# Patient Record
Sex: MALE | Race: BLACK OR AFRICAN AMERICAN | HISPANIC OR LATINO | Employment: UNEMPLOYED | URBAN - METROPOLITAN AREA
[De-identification: names, ages, dates, MRNs, and addresses within clinical notes are randomized per-mention and may not be internally consistent; named-entity substitution may affect disease eponyms.]

---

## 2020-08-31 ENCOUNTER — OFFICE VISIT (OUTPATIENT)
Dept: URGENT CARE | Age: 35
End: 2020-08-31
Payer: COMMERCIAL

## 2020-08-31 VITALS
BODY MASS INDEX: 21.55 KG/M2 | HEIGHT: 68 IN | HEART RATE: 86 BPM | WEIGHT: 142.2 LBS | OXYGEN SATURATION: 100 % | RESPIRATION RATE: 20 BRPM | TEMPERATURE: 98.7 F | SYSTOLIC BLOOD PRESSURE: 145 MMHG | DIASTOLIC BLOOD PRESSURE: 92 MMHG

## 2020-08-31 DIAGNOSIS — R07.89 CHEST DISCOMFORT: Primary | ICD-10-CM

## 2020-08-31 DIAGNOSIS — R07.89 CHEST WALL PAIN: ICD-10-CM

## 2020-08-31 PROCEDURE — 99203 OFFICE O/P NEW LOW 30 MIN: CPT | Performed by: NURSE PRACTITIONER

## 2020-08-31 PROCEDURE — S9088 SERVICES PROVIDED IN URGENT: HCPCS | Performed by: NURSE PRACTITIONER

## 2020-08-31 PROCEDURE — 93005 ELECTROCARDIOGRAM TRACING: CPT | Performed by: NURSE PRACTITIONER

## 2020-08-31 NOTE — PROGRESS NOTES
NAME: Timmy Sosa is a 30 y o  male  : 1985    MRN: [id number]    /92   Pulse 86   Temp 98 7 °F (37 1 °C)   Resp 20   Ht 5' 8" (1 727 m)   Wt 64 5 kg (142 lb 3 2 oz)   SpO2 100%   BMI 21 62 kg/m²     Assessment and Plan   Chest discomfort [R07 89]  1  Chest discomfort  ECG 12 lead   2  Chest wall pain         Cinhtia Cornell was seen today for chest pain  Diagnoses and all orders for this visit:    Chest discomfort  -     ECG 12 lead    Chest wall pain        Patient Instructions   Patient Instructions   No symptoms of pain today  EKG done today and Normal Sinus Rhythm  Pt aware to go to the ER if symptoms worsen or other signs of Chest pain, SOB, or discomfort occur  Pt verbalized understanding and girlfriend, ambulated out of the office with no signs of distress  My chart wilma was discussed and encouraged to download  Discussed obtaining a PCP    Proceed to ER if symptoms worsen  Chief Complaint     Chief Complaint   Patient presents with    Chest Pain     Pt reports intermittent left sided chest pain x 3 days  Denies jaw pain, has occ numbness in left elbow  No OTC meds taken  History of Present Illness     29 yo male here today with his girlfriend Jose Elias Valdes at bedside with c/o of Left sided CP  The pain started 3-4 days ago  PT has no symptoms today  Has taken nothing OTC for the symptoms, has no PCP and wanted to come and be seen  Denies CP, SOB, HA, nausea, vomiting, diarrhea, body aches at this time  Denies injury to the left side of chest and has no recent exercise in the past few days  He denies any recent exposure or symptoms of COVID  Denies fevers    Chest Pain    This is a new problem  The current episode started in the past 7 days (3-4 days)  The onset quality is gradual  The problem occurs intermittently  The problem has been resolved (asymptomatic today but wanted to be seen)  The pain is present in the lateral region (left sided )  The pain is at a severity of 0/10   The patient is experiencing no pain  The pain does not radiate  Associated symptoms comments: No other symptoms when pain was present    He has tried nothing for the symptoms  Risk factors include alcohol intake, smoking/tobacco exposure and male gender (drinks 4 beers or more daily)  Past medical history comments: denies hx   His family medical history is significant for diabetes, heart disease, hyperlipidemia and hypertension  Pertinent negatives for family medical history include: no sickle cell disease  Prior diagnostic workup includes echocardiogram        Review of Systems   Review of Systems   Constitutional: Negative  HENT: Negative  Respiratory: Negative  Cardiovascular: Positive for chest pain (asymptomatic today)  Gastrointestinal: Negative  Genitourinary: Negative  Musculoskeletal: Negative  Skin: Negative  Neurological: Negative  Current Medications     No current outpatient medications on file  Current Allergies     Allergies as of 08/31/2020    (No Known Allergies)              History reviewed  No pertinent past medical history  History reviewed  No pertinent surgical history  History reviewed  No pertinent family history  Medications have been verified  The following portions of the patient's history were reviewed and updated as appropriate: allergies, current medications, past family history, past medical history, past social history, past surgical history and problem list     Objective   /92   Pulse 86   Temp 98 7 °F (37 1 °C)   Resp 20   Ht 5' 8" (1 727 m)   Wt 64 5 kg (142 lb 3 2 oz)   SpO2 100%   BMI 21 62 kg/m²      Physical Exam     Physical Exam  Constitutional:       General: He is not in acute distress  Appearance: He is well-developed  He is not ill-appearing  Neck:      Musculoskeletal: Full passive range of motion without pain and normal range of motion     Cardiovascular:      Rate and Rhythm: Normal rate and regular rhythm  Heart sounds: Normal heart sounds  Pulmonary:      Effort: Pulmonary effort is normal       Breath sounds: Normal breath sounds and air entry  No stridor  No decreased breath sounds, wheezing or rhonchi  Chest:      Chest wall: No mass  Breasts: Breasts are symmetrical          Right: Normal          Left: Normal       Comments: Unable to reproduce the symptoms to chest at time of exam  Musculoskeletal: Normal range of motion  Skin:     General: Skin is warm  Capillary Refill: Capillary refill takes less than 2 seconds  Comments: No abrasions, open wounds, signs of injury or trauma to the chest   Neurological:      Mental Status: He is alert and oriented to person, place, and time     Psychiatric:         Attention and Perception: Attention normal          Mood and Affect: Mood normal          GANGA Bass

## 2020-08-31 NOTE — PATIENT INSTRUCTIONS
No symptoms of pain today  EKG done today and Normal Sinus Rhythm  Pt aware to go to the ER if symptoms worsen or other signs of Chest pain, SOB, or discomfort occur   Pt verbalized understanding and girlfriend, ambulated out of the office with no signs of distress  My chart wilma was discussed and encouraged to download  Discussed obtaining a PCP

## 2020-09-01 LAB
ATRIAL RATE: 76 BPM
P AXIS: 60 DEGREES
PR INTERVAL: 140 MS
QRS AXIS: 40 DEGREES
QRSD INTERVAL: 74 MS
QT INTERVAL: 342 MS
QTC INTERVAL: 384 MS
T WAVE AXIS: 47 DEGREES
VENTRICULAR RATE: 76 BPM

## 2020-09-01 PROCEDURE — 93010 ELECTROCARDIOGRAM REPORT: CPT | Performed by: INTERNAL MEDICINE

## 2021-09-10 ENCOUNTER — OFFICE VISIT (OUTPATIENT)
Dept: URGENT CARE | Age: 36
End: 2021-09-10
Payer: COMMERCIAL

## 2021-09-10 VITALS
TEMPERATURE: 97.1 F | RESPIRATION RATE: 16 BRPM | OXYGEN SATURATION: 97 % | HEART RATE: 104 BPM | DIASTOLIC BLOOD PRESSURE: 76 MMHG | SYSTOLIC BLOOD PRESSURE: 139 MMHG

## 2021-09-10 DIAGNOSIS — S01.81XA FACIAL LACERATION, INITIAL ENCOUNTER: Primary | ICD-10-CM

## 2021-09-10 PROCEDURE — S9088 SERVICES PROVIDED IN URGENT: HCPCS | Performed by: PHYSICIAN ASSISTANT

## 2021-09-10 PROCEDURE — 12011 RPR F/E/E/N/L/M 2.5 CM/<: CPT | Performed by: PHYSICIAN ASSISTANT

## 2021-09-10 PROCEDURE — 99213 OFFICE O/P EST LOW 20 MIN: CPT | Performed by: PHYSICIAN ASSISTANT
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
## 2021-09-10 NOTE — PATIENT INSTRUCTIONS
Keep area dry and covered for 24 hours, then wash daily with soap and water if possible   Keep covered with vaseline and a bandage when not bathing  Do not exercise for 24 hours  Return in 5-7 days for suture removal  Return sooner if fever, chills, redness, swelling, severe pain, purulent discharge

## 2021-09-19 ENCOUNTER — OFFICE VISIT (OUTPATIENT)
Dept: URGENT CARE | Age: 36
End: 2021-09-19
Payer: COMMERCIAL

## 2021-09-19 VITALS
HEIGHT: 68 IN | HEART RATE: 75 BPM | TEMPERATURE: 98 F | WEIGHT: 155 LBS | OXYGEN SATURATION: 98 % | RESPIRATION RATE: 18 BRPM | BODY MASS INDEX: 23.49 KG/M2 | SYSTOLIC BLOOD PRESSURE: 129 MMHG | DIASTOLIC BLOOD PRESSURE: 79 MMHG

## 2021-09-19 DIAGNOSIS — S01.81XD FACIAL LACERATION, SUBSEQUENT ENCOUNTER: Primary | ICD-10-CM

## 2021-09-19 DIAGNOSIS — Z48.02 ENCOUNTER FOR REMOVAL OF SUTURES: ICD-10-CM

## 2021-09-19 PROCEDURE — S9088 SERVICES PROVIDED IN URGENT: HCPCS | Performed by: PHYSICIAN ASSISTANT

## 2021-09-19 PROCEDURE — 99213 OFFICE O/P EST LOW 20 MIN: CPT | Performed by: PHYSICIAN ASSISTANT

## 2021-09-19 NOTE — PROGRESS NOTES
[facility]        NAME: Elizabeth Adorno is a 29 y o  male  : 1985    MRN: [id number]  DATE: 2021  TIME: 5:15 PM    Assessment and Plan   Facial laceration, subsequent encounter [S01 81XD]  1  Facial laceration, subsequent encounter     2  Encounter for removal of sutures           Patient Instructions       Follow up with PCP in 3-5 days  Proceed to  ER if symptoms worsen  Chief Complaint     Chief Complaint   Patient presents with    Suture / Staple Removal     sutures placed 09/10/21         History of Present Illness       Patient is a 28 y/o/m [facility]   Pt sustained facial laceration on 09/10/21 and had 5 sutures placed at that time  Pt denies any complications  No fever or drainage  Suture / Staple Removal  The sutures were placed 7 to 10 days ago  Review of Systems   Review of Systems   Constitutional: Negative for chills and fever  HENT: Negative for ear pain and sore throat  Eyes: Negative for pain and visual disturbance  Respiratory: Negative for cough and shortness of breath  Cardiovascular: Negative for chest pain and palpitations  Gastrointestinal: Negative for abdominal pain and vomiting  Genitourinary: Negative for dysuria and hematuria  Musculoskeletal: Negative for arthralgias and back pain  Skin: Positive for wound  Negative for color change and rash  Neurological: Negative for seizures and syncope  All other systems reviewed and are negative  Current Medications     No current outpatient medications on file  Current Allergies     Allergies as of 2021    (No Known Allergies)            The following portions of the patient's history were reviewed and updated as appropriate: allergies, current medications, past family history, past medical history, past social history, past surgical history and problem list      History reviewed  No pertinent past medical history      History reviewed  No pertinent surgical history  History reviewed  No pertinent family history  Medications have been verified  Objective   /79   Pulse 75   Temp 98 °F (36 7 °C)   Resp 18   Ht 5' 8" (1 727 m)   Wt 70 3 kg (155 lb)   SpO2 98%   BMI 23 57 kg/m²   No LMP for male patient  Physical Exam     Physical Exam  Constitutional:       Appearance: Normal appearance  He is normal weight  HENT:      Head: Normocephalic and atraumatic  Nose: Nose normal       Mouth/Throat:      Mouth: Mucous membranes are moist    Eyes:      Extraocular Movements: Extraocular movements intact  Conjunctiva/sclera: Conjunctivae normal       Pupils: Pupils are equal, round, and reactive to light  Cardiovascular:      Rate and Rhythm: Normal rate  Pulmonary:      Effort: Pulmonary effort is normal    Musculoskeletal:         General: Normal range of motion  Cervical back: Normal range of motion and neck supple  Skin:     General: Skin is warm and dry  Neurological:      General: No focal deficit present  Mental Status: He is alert and oriented to person, place, and time  Psychiatric:         Mood and Affect: Mood normal          Behavior: Behavior normal            Suture removal    Date/Time: 9/19/2021 5:33 PM  Performed by: Otis Arredondo PA-C  Authorized by: Otis Arredondo PA-C   Universal Protocol:  Consent: Verbal consent obtained  Risks and benefits: risks, benefits and alternatives were discussed  Consent given by: patient  Patient understanding: patient states understanding of the procedure being performed        Patient location:  Clinic  Location:     Laterality:  Right    Location:  [facility] location:  [address] [place] [address] location:  R cheek  Procedure details:      Tools used:  Suture removal kit    Wound appearance:  No sign(s) of infection, good wound healing and clean    Number of sutures removed:  5  Post-procedure details:     Post-removal:  No dressing applied    Patient tolerance of procedure:   Tolerated well, no immediate complications

## 2021-09-19 NOTE — PATIENT INSTRUCTIONS
Stitches Removal   AMBULATORY CARE:   Stitches  may need to be removed in 3 to 14 days depending on the location of your wound  Your healthcare provider will use sterile forceps or tweezers to  the knot of each stitch  He will cut the stitch with scissors and pull the stitch out  You may feel a slight tug as the stitch comes out  He may place small steristrips across your wound after the stitches have been removed  These pieces of tape will peel and fall of on their own  Do not pull them off  Seek care immediately if:   · Your wound splits open  · You suddenly cannot move your injured joint  · You have sudden numbness around your wound  · You see red streaks coming from your wound  Contact your healthcare provider if:   · You have a fever and chills  · Your wound is red, warm, swollen, or leaking pus  · There is a bad smell coming from your wound  · You have increased pain in the wound area  · You have questions or concerns about your condition or care  Care for your wound:   · Clean your wound as directed  Carefully wash your wound with soap and water  Pat the area dry with a clean towel  · Protect your wound  Your wound can swell, bleed, or split open if it is stretched or bumped  You may need to wear a bandage that supports your wound until it is completely healed  · Minimize your scar  Use sunblock if your wound is exposed to the sun  Apply it every day after the stitches are removed  This will help prevent skin discoloration  Follow up with your healthcare provider as directed: You may need to return in 3 to 5 days if the stitches are on your face  Stitches on your scalp need to be removed after 7 to 14 days  Stitches over joints may remain in place up to 14 days  Write down your questions so you remember to ask them during your visits     © 2017 [employer] Information is for End User's use only and may not be sold, redistributed or otherwise used for commercial purposes  All illustrations and images included in CareNotes® are the copyrighted property of [employer]  or [employer]  The above information is an  only  It is not intended as medical advice for individual conditions or treatments  Talk to your doctor, nurse or pharmacist before following any medical regimen to see if it is safe and effective for you

## 2021-11-08 ENCOUNTER — APPOINTMENT (OUTPATIENT)
Dept: URGENT CARE | Age: 36
End: 2021-11-08
Payer: OTHER MISCELLANEOUS

## 2021-11-08 PROCEDURE — 99283 EMERGENCY DEPT VISIT LOW MDM: CPT | Performed by: NURSE PRACTITIONER

## 2021-11-08 PROCEDURE — G0382 LEV 3 HOSP TYPE B ED VISIT: HCPCS | Performed by: NURSE PRACTITIONER
